# Patient Record
Sex: FEMALE | Race: WHITE
[De-identification: names, ages, dates, MRNs, and addresses within clinical notes are randomized per-mention and may not be internally consistent; named-entity substitution may affect disease eponyms.]

---

## 2018-03-14 ENCOUNTER — HOSPITAL ENCOUNTER (OUTPATIENT)
Dept: HOSPITAL 35 - RAD | Age: 83
End: 2018-03-14
Attending: FAMILY MEDICINE
Payer: COMMERCIAL

## 2018-03-14 DIAGNOSIS — Z12.31: Primary | ICD-10-CM

## 2018-05-17 ENCOUNTER — HOSPITAL ENCOUNTER (OUTPATIENT)
Dept: HOSPITAL 35 - GI | Age: 83
Discharge: HOME | End: 2018-05-17
Attending: SPECIALIST
Payer: COMMERCIAL

## 2018-05-17 VITALS — WEIGHT: 134 LBS | BODY MASS INDEX: 23.74 KG/M2 | HEIGHT: 62.99 IN

## 2018-05-17 DIAGNOSIS — Z98.890: ICD-10-CM

## 2018-05-17 DIAGNOSIS — K22.8: Primary | ICD-10-CM

## 2018-05-17 DIAGNOSIS — Z96.611: ICD-10-CM

## 2018-05-17 DIAGNOSIS — Z79.899: ICD-10-CM

## 2018-05-17 DIAGNOSIS — F32.9: ICD-10-CM

## 2018-05-17 DIAGNOSIS — I10: ICD-10-CM

## 2018-05-17 DIAGNOSIS — Z90.49: ICD-10-CM

## 2018-05-17 DIAGNOSIS — Z79.82: ICD-10-CM

## 2018-05-17 DIAGNOSIS — Z87.891: ICD-10-CM

## 2018-05-17 DIAGNOSIS — K57.10: ICD-10-CM

## 2018-05-17 DIAGNOSIS — Z96.651: ICD-10-CM

## 2018-05-17 DIAGNOSIS — Z88.8: ICD-10-CM

## 2018-05-17 DIAGNOSIS — F41.9: ICD-10-CM

## 2018-05-17 DIAGNOSIS — K21.9: ICD-10-CM

## 2018-05-17 PROCEDURE — 62900: CPT

## 2018-05-17 PROCEDURE — 62110: CPT

## 2018-06-29 ENCOUNTER — HOSPITAL ENCOUNTER (OUTPATIENT)
Dept: HOSPITAL 35 - GI | Age: 83
End: 2018-06-29
Attending: SPECIALIST
Payer: COMMERCIAL

## 2018-06-29 VITALS — WEIGHT: 130 LBS | HEIGHT: 62.99 IN | BODY MASS INDEX: 23.04 KG/M2

## 2018-06-29 DIAGNOSIS — F32.9: ICD-10-CM

## 2018-06-29 DIAGNOSIS — Z88.8: ICD-10-CM

## 2018-06-29 DIAGNOSIS — G62.9: ICD-10-CM

## 2018-06-29 DIAGNOSIS — Z79.899: ICD-10-CM

## 2018-06-29 DIAGNOSIS — Z86.711: ICD-10-CM

## 2018-06-29 DIAGNOSIS — D12.0: Primary | ICD-10-CM

## 2018-06-29 DIAGNOSIS — Z87.891: ICD-10-CM

## 2018-06-29 DIAGNOSIS — Z98.890: ICD-10-CM

## 2018-06-29 DIAGNOSIS — K21.9: ICD-10-CM

## 2018-06-29 DIAGNOSIS — D12.3: ICD-10-CM

## 2018-06-29 DIAGNOSIS — Z96.611: ICD-10-CM

## 2018-06-29 DIAGNOSIS — Z90.89: ICD-10-CM

## 2018-06-29 DIAGNOSIS — Z90.49: ICD-10-CM

## 2018-06-29 DIAGNOSIS — Z96.651: ICD-10-CM

## 2018-06-29 DIAGNOSIS — Z79.82: ICD-10-CM

## 2018-06-29 DIAGNOSIS — Z86.718: ICD-10-CM

## 2018-06-29 DIAGNOSIS — K57.30: ICD-10-CM

## 2018-06-29 PROCEDURE — 62110: CPT

## 2018-06-29 PROCEDURE — 62900: CPT

## 2020-01-06 NOTE — NUR
PT ADMITTED FROM ED . A&OX4.IV INTACT IN L UA. INCONT OF BLADDER D/T
URGENCY. ORIENTED PT TO ROOM/CALL LIGHT. IV NS @ 200/HR STARTED THAT WAS
ORDERED IN ED. CALL LIGHT W/I REACH, BED ALARM IS ON.

## 2020-01-06 NOTE — EKG
[place] [address] [address] [address]
[place]  [address]
Phone:  [phone]                    ELECTROCARDIOGRAM REPORT      
_______________________________________________________________________________
 
Name:       KELLI GOULD SNEHAL           Room #:                     [facility]#:      [id number]     Account #:      [id number]  
Admission:  20    Attend Phys:                          
Discharge:              Date of Birth:  [birth year]  
                                                          Report #: [id number]
   [id number]
_______________________________________________________________________________
THIS REPORT FOR:   //name//                          
 
                         [facility] ED
                                       
Test Date:    2020               Test Time:    14:03:46
Pat Name:     KELLI GOULD          Department:   
Patient ID:   [id number]            Room:          
Gender:       F                        Technician:   KOTA
:          [id number]               Requested By: Natasha Montano
Order Number: [id number] MD:   Roland Acosta
                                 Measurements
Intervals                              Axis          
Rate:         77                       P:            63
GA:           168                      QRS:          46
QRSD:         128                      T:            56
QT:           438                                    
QTc:          496                                    
                           Interpretive Statements
Sinus rhythm
Nonspecific intraventricular conduction delay
Borderline T abnormalities, anterior leads
Compared to ECG 2012 10:07:19
Intraventricular conduction delay now present
T-wave abnormality now present
 
Electronically Signed On 2020 15:07:21 CST by Roland Acosta
[email or web address]
 
 
 
 
 
 
 
 
 
 
 
 
 
 
 
 
  <ELECTRONICALLY SIGNED>
   By: Roland Acosta MD        
  20     1507
D: 20 1403                           _____________________________________
T: 20 1403                           Roland Acosta MD          /YAEL

## 2020-01-07 NOTE — NUR
ASSUMED CARE OF PT AT 1900HRS. PT IS AOX4 AND LETS NEEDS BE KNOWN. FALL
PRECAUTION IN PLACE. PT HAS STRESS INCONTINENCE. PT WAS PLACE ON EXTERNAL
FEMALE CATH AND IT'S WORKING WELL. PT WAS ABLE TO GET COMFORTALE AND SLEEP
PART OF THE SHIFT. VSS AND NO S/S OF ACUTE DISTRESS. WILL CONTINUE TO MONITOR.
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
## 2020-01-07 NOTE — NUR
Received awake on bed. Due medications given as prescribed, able to swallow
meds w/o difficulty. A+O. On room air. On regular diet- tolerating well; no
nausea, no vomiting and no abdominal pain noted. With external watson in place-
output measured and recorded. Assisted in ADLs. Visited by relative today.
With Ns + 20meqs of KCL at 80cc/hr, infusing well at L AC. Turned to her sides
regularly. Falls bundle in place. Assisted in ADLs. Pt seen by Dr Serrato
today- for PT/OT evaluation today, possible Home with HH oR SNF.

## 2020-01-08 NOTE — NUR
DISCHARGE PLANNING.  ANTICIAPTED DISCHARGE IN ONE TO TWO DAYS.  POST ACUTE
RECOMMENDED AT DISCHARGE.  PATIENT REFERRAL FAXED [facility], PER
REQUEST.  CALL PLACED TO [facility]. VOICEMAIL LEFT FOR [facility] ADMISSIONS.
UNIT SW AWARE.  FOLLOWING.

## 2020-01-08 NOTE — NUR
PT A&OX4, VSS, DENIES PAIN. CHANGES ARE BEING MADE TO ANTIBIOTICS, AWAITING
NEW ORDERS. PATIENT TRANFER TO SERNIOR SUITS. NO SIGNS OF DISTRESS. REPORT
GIVEN TO SENIOR SUITES. FAMILY CALLED BY

## 2020-01-08 NOTE — NUR
PT IS AOX4, VSS, NO C/O. PT IS UP IN THE RECLINER, CALL LIGHT/PERSONAL ITEMS
IN REACH. PT IN GOOD SPIRITS, WILL CONTINE TO MONITOR PT.

## 2020-01-08 NOTE — NUR
DAVE ATTEMPTED TO VISIT WITH PT THIS AM BUT SHE WAS SLEEPING. DAVE CALLED HER DTR
BRANDON AND SHE INDICATED THAT THEY WOULD LIKE REFERRAL SENT TO [facility] FOR REVIEW
FOR POSSIBLE ADMISSION. DAVE WAS NOTIFIED THAT PT IS TO BE TRANSFERED TO [facility] ON
SS THIS DAY. CM TO FOLLOW AS INIDCATED WITH DC PLANNING.

## 2020-01-08 NOTE — NUR
ASSUMED CARE OF PT AT 1900HRS. PT IS AOX4 AND LETS NEEDS BE KNOWN. FALL
PRECAUTION IN PLACE. ABX TREATMENT CONTINUED. PT WAS INCT THIS SHIFT AND WAS
CLEANED AND CHANGED SEVERAL TIMES. PT HAS AN ELEVATED BP. PT WAS ABLE TO GET
COMFORTABLE AND SLEEP PART OF THE SHIFT. PT PROGRESSING TOWARDS DC GOALS. WILL
CONTINUE TO MONITOR.

## 2020-01-09 NOTE — NUR
ASSUMED PT CARE APPROX 1905. PT IS A&OX4. PT HAS NO IV ACCESS. PT TAKES
SCHEDULED MEDICATION. PT DENIES PAIN. PT HAS NO SKIN ISSUES. PT DOES LIKE TO
WEAR A BRIEF. I HAVE EDUCATED THE PT ABOUT THE RISK OF WEARING A BRIEF AND
HAVING INCONTINENCE. PT IS A FALL RISK. PT CALLS OUT APPROPRIATELY. PT USES
THE WALKER TO GET TO THE BSC. ONCE SLEEP THE PT TALKS/ SCREAMS IN HER SLEEP.
PT IS NOW LSLEEP IN HER ROOM.  WILL CONTINUE TO MONITOR.

## 2020-01-09 NOTE — NUR
PT IS A&OX4. PT HAS NO IV SITE. PT TOOK SCHEDULED MEDICATION WITH NO PROBLEM.
PT IS A SBA TO THE CAMODE. BRIEF IS SOAKED WHEN THE PT CALLS TO GET UP TO THE
CAMODE. PT PREFERS TO KEEP A POISE LINER AND A DEPEND ON. I TOLD THE PT THAT
IT WOULD BE BEST TO NOT WEAR A BRIEF BECAUSE HER SITTING IN THE MOISTURE CAN
CAUSE SKIN BREAKDOWN. PT WAS ASLEEP AND HAD A MOMENT OF SCREAMING IN HER
SLEEP. PT SLEPT THOUGHOUT THE NIGHT. WILL CONTINUE TO MONITOR.

## 2020-01-09 NOTE — NUR
JESSENIA reviewed chart and spoke with nursing and attending physician. Pt was
transferred to  from 4 and is progressing towards goals for discharge.
Discharge to [facility] SN[facility] is anticipated for tomorrow. JESSENIA met with pt at bedside to
discuss discharge plan. Pt states she is hoping to go home with HH services
tomorrow. However, pt is agreeable with going to [facility] if attending
physician feels it is needed. [facility] if [facility]
can go home with HH. JESSENIA is following to assist as needed with discharge
planning.

## 2020-01-10 NOTE — NUR
PT IS AOX4, VSS, NO C/O PAIN. PT UP AD JACEK WITH STANDBY ASSIST. PT TOLERATING
DIET WELL. IV DC'D, PT DISCHARGED BACK TO [facility]. CALLED TO GIVE
REPORT TO NURSE SHUKLA. PT TRANSFERRED VIA [facility].

## 2020-01-10 NOTE — NUR
DISCHARGE NOTE:
JESSENIA reviewed chart and spoke with nursing and attending physician. Pt is
medically stable for discharge today. SW met with pt at bedside to discuss
discharge plan. Pt states she is agreeable with going to [facility] for
a skilled stay prior to returning home. SW had faxed HH referral to
[facility]. JESSENIA updated HH liaison, that pt is going to a skilled
facility. Awaiting final discharge orders/summary at this time. Chart will
need to be copied. Discharge planner to coordinate transportation and notify
family when discharge ppwk is available. SW is available to assist should
needs arise.